# Patient Record
(demographics unavailable — no encounter records)

---

## 2017-09-20 NOTE — [facility]
RIGHT SHOULDER THREE VIEWS:

 

History: Pain, arthritis. 

 

FINDINGS: 

There are degenerative changes in the right acromioclavicular joint. No fracture, dislocation, or crystal
ny destruction is seen. 

 

POS: [facility]

## 2017-09-20 NOTE — [facility]
LEFT SHOULDER THREE VIEWS:

 

HISTORY:

Pain in the left shoulder.  Arthritis.

 

FINDINGS:

No fracture, dislocation, or bony destruction is seen.  Mild arthritic changes are present.

 

POS: [facility]

## 2018-04-09 NOTE — RAD
LUMBAR SPINE 4 VIEWS:

 

HISTORY: 

Osteonecrosis.

 

COMPARISON: 

None.

 

FINDINGS: 

There are 5 lumbar-type vertebral bodies.  There is mild leftward curvature of the lower lumbar spine
.  There is multilevel degenerative change with loss of disk space height and osteophyte formation.  
There is 9 mm of anterolisthesis of L4 upon L5, 3.6 mm anterolisthesis of L3 upon L4, 3.2 mm of retro
listhesis of L2 upon L3, 2.5 mm retrolisthesis of L1 upon L2.

 

No evidence of fracture.  Questionable stenosis of the L5 vertebral body, evaluation is limited.

 

Upon extension, there is 4.0 mm of anterolisthesis of L4 upon L5, 5 mm of retrolisthesis of L3 upon L
4, 2 mm retrolisthesis of L2 upon L1, 3.4 mm of anterolisthesis of L1 upon L2.  Upon flexion, there i
s 5.4 mm anterolisthesis of L4 upon L5, 2.1 mm of L3 upon L4, 5 mm retrolisthesis of L2 upon L3, and 
5 mm of retrolisthesis of L1 upon L2.  There appears to be pseudoarthrosis of the left L5 ala with sa
hina.

 

IMPRESSION: 

1.  Spondylolisthesis as above.

 

2.  Increased sclerosis of the L5 vertebral body, incompletely evaluated.  Consider MRI if there is c
oncern for infection. 

 

POS: [facility]